# Patient Record
(demographics unavailable — no encounter records)

---

## 2024-11-18 NOTE — HEALTH RISK ASSESSMENT
[Very Good] : ~his/her~  mood as very good [Intercurrent hospitalizations] : was admitted to the hospital  [Yes] : Yes [2 - 3 times a week (3 pts)] : 2 - 3  times a week (3 points) [No] : In the past 12 months have you used drugs other than those required for medical reasons? No [No falls in past year] : Patient reported no falls in the past year [0] : 2) Feeling down, depressed, or hopeless: Not at all (0) [Time Spent: ___ Minutes] : I spent [unfilled] minutes performing a depression screening for this patient. [Designated Healthcare Proxy] : Designated healthcare proxy [Name: ___] : Health Care Proxy's Name: [unfilled]  [Relationship: ___] : Relationship: [unfilled] [Never] : Never [Patient reported mammogram was normal] : Patient reported mammogram was normal [Patient reported bone density results were normal] : Patient reported bone density results were normal [HIV Test offered] : HIV Test offered [Hepatitis C test offered] : Hepatitis C test offered [None] : None [With Family] : lives with family [# of Members in Household ___] :  household currently consist of [unfilled] member(s) [Retired] : retired [College] : College [] :  [# Of Children ___] : has [unfilled] children [Feels Safe at Home] : Feels safe at home [Fully functional (bathing, dressing, toileting, transferring, walking, feeding)] : Fully functional (bathing, dressing, toileting, transferring, walking, feeding) [Fully functional (using the telephone, shopping, preparing meals, housekeeping, doing laundry, using] : Fully functional and needs no help or supervision to perform IADLs (using the telephone, shopping, preparing meals, housekeeping, doing laundry, using transportation, managing medications and managing finances) [Reports normal functional visual acuity (ie: able to read med bottle)] : Reports normal functional visual acuity [Smoke Detector] : smoke detector [Carbon Monoxide Detector] : carbon monoxide detector [Seat Belt] :  uses seat belt [Sunscreen] : uses sunscreen [de-identified] : see above [de-identified] : 3-4 glasses wine [de-identified] : works out with  2x weekly; stationary bike ---2x weekly; golf ---3x weekly [de-identified] : ad rodriguez [[id number]] : 0 [AdvancecareDate] : 04/22 [Change in mental status noted] : No change in mental status noted [Language] : denies difficulty with language [Handling Complex Tasks] : denies difficulty handling complex tasks [High Risk Behavior] : no high risk behavior [Reports changes in hearing] : Reports no changes in hearing [Reports changes in vision] : Reports no changes in vision [Reports changes in dental health] : Reports no changes in dental health [Travel to Developing Areas] : does not  travel to developing areas [TB Exposure] : is not being exposed to tuberculosis [MammogramDate] : 5/2024 [BoneDensityDate] : 7/2023 [ColonoscopyDate] : 03/22 [ColonoscopyComments] : repeat 5 years  [FreeTextEntry2] :  - [facility]  [FreeTextEntry3] : 2 grandchildren  [de-identified] : L cataract

## 2024-11-18 NOTE — PLAN
[FreeTextEntry1] : [ ] shingrix  GI Dr. 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 Lisker   imp--   hypertension---normotensive on meds  arteriosclerosis---nonocclussive disease on cath in 2010; asx. stress tests neg. in past  #HLD lifestyle modification discussed at length discussed transition to high potency statin given hx of CAD, with goal LDL < 70 - will discuss with Dr. 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 Keller's group; f/u carcinoid tumor per [facility] [place] thoracic surg. group annual CT chest  #Pancreatic lesion MRI recently 6/2024 for monitoring - will discuss with GI  [ ] Dr. Sneed - GI     I spent a total of 40 minutes on the date of this encounter that EXCLUDES time spent on AWV, preventive exam, depression screening, tobacco cessation, lung cancer screening and behavioral counseling.  This additional time included: Preparing to see the patient (e.g., review of test results) Obtaining and/or reviewing separately obtained history Performing a medically appropriate exam and/or evaluation Counseling and educating the patient/family/caregiver Ordering medications, tests, procedures Referring and communicating with other healthcare professionals, when not separately reported Documenting clinical information in the health record Care coordination not separately reported

## 2024-11-18 NOTE — HISTORY OF PRESENT ILLNESS
[FreeTextEntry1] : Patient presents today for follow-up of chronic medical conditions.  [de-identified] : PMHx: HTN, HLD   will be traveling to [place] in a couple of weeks feels well - some stress but otherwise feels okay upcoming surveillance screening   recently traveled to [place] for [place] and began feeling unwell dry cough, wheezing, low grade fever  went to  in [place] and prescribed cocktail of tessalon perles, steroid taper, albuterol, z-pack  helps somewhat went to ED and got CXR and prednisone - CXR clear, tested positive for RSV  still with dry cough, wheezing, but overall improving  specialists--- GI Dr. 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 Keller; gets CT annually  Cardiology - Dr. Noble

## 2025-06-09 NOTE — HISTORY OF PRESENT ILLNESS
[FreeTextEntry1] : AWV Patient presents today for follow-up of chronic medical conditions.  [de-identified] : PMHx: HTN, HLD   feels sick along with her  since last week dry cough, fever, fatigue, lack of appetite, diarrhea, taste is off    spent winter in [place] -  cipro/flagyl completed 7d course     will be traveling to [place] in a couple of weeks feels well - some stress but otherwise feels okay upcoming surveillance screening   recently traveled to [place] for [place]iving and began feeling unwell dry cough, wheezing, low grade fever  went to  in [place] and prescribed cocktail of tessalon perles, steroid taper, albuterol, z-pack  helps somewhat went to ED and got CXR and prednisone - CXR clear, tested positive for RSV  still with dry cough, wheezing, but overall improving  specialists--- GI Dr. 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 Keller; gets CT annually  Cardiology - Dr. Noble

## 2025-06-09 NOTE — HEALTH RISK ASSESSMENT
[Very Good] : ~his/her~  mood as very good [Intercurrent hospitalizations] : was admitted to the hospital  [2 - 3 times a week (3 pts)] : 2 - 3  times a week (3 points) [No falls in past year] : Patient reported no falls in the past year [No] : In the past 12 months have you used drugs other than those required for medical reasons? No [0] : 1) Little interest or pleasure doing things: Not at all (0) [Time Spent: ___ Minutes] : I spent [unfilled] minutes performing a depression screening for this patient. [Never] : Never [Patient reported mammogram was normal] : Patient reported mammogram was normal [Patient reported bone density results were normal] : Patient reported bone density results were normal [HIV Test offered] : HIV Test offered [Hepatitis C test offered] : Hepatitis C test offered [With Family] : lives with family [# of Members in Household ___] :  household currently consist of [unfilled] member(s) [Retired] : retired [College] : College [] :  [# Of Children ___] : has [unfilled] children [Feels Safe at Home] : Feels safe at home [Fully functional (bathing, dressing, toileting, transferring, walking, feeding)] : Fully functional (bathing, dressing, toileting, transferring, walking, feeding) [Fully functional (using the telephone, shopping, preparing meals, housekeeping, doing laundry, using] : Fully functional and needs no help or supervision to perform IADLs (using the telephone, shopping, preparing meals, housekeeping, doing laundry, using transportation, managing medications and managing finances) [Reports normal functional visual acuity (ie: able to read med bottle)] : Reports normal functional visual acuity [Carbon Monoxide Detector] : carbon monoxide detector [Smoke Detector] : smoke detector [Seat Belt] :  uses seat belt [Sunscreen] : uses sunscreen [Designated Healthcare Proxy] : Designated healthcare proxy [Name: ___] : Health Care Proxy's Name: [unfilled]  [Relationship: ___] : Relationship: [unfilled] [None] : Patient does not have any barriers to medication adherence [Opioids] : opioids [Yes] : Reviewed medication list for presence of high-risk medications. [NO] : No [Patient reported PAP Smear was normal] : Patient reported PAP Smear was normal [de-identified] : see above [de-identified] : 3-4 glasses wine [de-identified] : works out with  2x weekly; stationary bike ---2x weekly; golf ---3x weekly [de-identified] : ad rodriguez [[id number]] : 0 [Change in mental status noted] : No change in mental status noted [Language] : denies difficulty with language [Handling Complex Tasks] : denies difficulty handling complex tasks [High Risk Behavior] : no high risk behavior [Reports changes in hearing] : Reports no changes in hearing [Reports changes in vision] : Reports no changes in vision [Reports changes in dental health] : Reports no changes in dental health [Travel to Developing Areas] : does not  travel to developing areas [TB Exposure] : is not being exposed to tuberculosis [PapSmearDate] : 11/2024 [MammogramDate] : 5/2025 [BoneDensityDate] : 7/2023 [ColonoscopyDate] : 03/22 [ColonoscopyComments] : repeat 5 years  [FreeTextEntry3] : 2 grandchildren  [FreeTextEntry2] :  - [facility]  [de-identified] : L cataract  [AdvancecareDate] : 04/22

## 2025-06-09 NOTE — PHYSICAL EXAM
[No Acute Distress] : no acute distress [Well Nourished] : well nourished [Well Developed] : well developed [Well-Appearing] : well-appearing [PERRL] : pupils equal round and reactive to light [Normal Sclera/Conjunctiva] : normal sclera/conjunctiva [EOMI] : extraocular movements intact [Normal Outer Ear/Nose] : the outer ears and nose were normal in appearance [Normal Oropharynx] : the oropharynx was normal [No JVD] : no jugular venous distention [No Lymphadenopathy] : no lymphadenopathy [Supple] : supple [Thyroid Normal, No Nodules] : the thyroid was normal and there were no nodules present [No Respiratory Distress] : no respiratory distress  [No Accessory Muscle Use] : no accessory muscle use [Clear to Auscultation] : lungs were clear to auscultation bilaterally [Normal Rate] : normal rate  [Regular Rhythm] : with a regular rhythm [Normal S1, S2] : normal S1 and S2 [No Murmur] : no murmur heard [No Carotid Bruits] : no carotid bruits [No Abdominal Bruit] : a ~M bruit was not heard ~T in the abdomen [No Varicosities] : no varicosities [Pedal Pulses Present] : the pedal pulses are present [No Edema] : there was no peripheral edema [No Palpable Aorta] : no palpable aorta [No Extremity Clubbing/Cyanosis] : no extremity clubbing/cyanosis [Soft] : abdomen soft [Non Tender] : non-tender [Non-distended] : non-distended [No Masses] : no abdominal mass palpated [No HSM] : no HSM [Normal Bowel Sounds] : normal bowel sounds [No CVA Tenderness] : no CVA  tenderness [No Joint Swelling] : no joint swelling [No Spinal Tenderness] : no spinal tenderness [Grossly Normal Strength/Tone] : grossly normal strength/tone [No Rash] : no rash [Coordination Grossly Intact] : coordination grossly intact [No Focal Deficits] : no focal deficits [Normal Gait] : normal gait [Deep Tendon Reflexes (DTR)] : deep tendon reflexes were 2+ and symmetric [Normal Affect] : the affect was normal [Normal Insight/Judgement] : insight and judgment were intact

## 2025-06-09 NOTE — HISTORY OF PRESENT ILLNESS
[FreeTextEntry1] : AWV Patient presents today for follow-up of chronic medical conditions.  [de-identified] : PMHx: HTN, HLD   feels sick along with her  since last week dry cough, fever, fatigue, lack of appetite, diarrhea, taste is off    spent winter in [place] -  cipro/flagyl completed 7d course     will be traveling to [place] in a couple of weeks feels well - some stress but otherwise feels okay upcoming surveillance screening   recently traveled to [place] for [place] and began feeling unwell dry cough, wheezing, low grade fever  went to  in [place] and prescribed cocktail of tessalon perles, steroid taper, albuterol, z-pack  helps somewhat went to ED and got CXR and prednisone - CXR clear, tested positive for RSV  still with dry cough, wheezing, but overall improving  specialists--- GI Dr. 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 Keller; gets CT annually  Cardiology - Dr. Noble

## 2025-06-09 NOTE — PLAN
[FreeTextEntry1] : EKG obtained to assist in diagnosis and management of assessed problem(s).    EKG shows sinus rhythm with poor R wave progression without acute ischemic changes.  shingrix complete   GI Dr. 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 Lisker   imp--   hypertension---normotensive on meds  arteriosclerosis---nonocclussive disease on cath in 2010; asx. stress tests neg. in past  #HLD lifestyle modification discussed at length discussed transition to high potency statin given hx of CAD, with goal LDL < 70 - will discuss with Dr. 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 Keller's group; f/u carcinoid tumor per [facility] thoracic surg. group annual CT chest  #Pancreatic lesion incidental finding MRI recently 6/2024 > 6/2025 for monitoring [ ] Dr. Sneed - GI     I spent a total of 40 minutes on the date of this encounter that EXCLUDES time spent on AWV, preventive exam, depression screening, tobacco cessation, lung cancer screening and behavioral counseling.  This additional time included: Preparing to see the patient (e.g., review of test results) Obtaining and/or reviewing separately obtained history Performing a medically appropriate exam and/or evaluation Counseling and educating the patient/family/caregiver Ordering medications, tests, procedures Referring and communicating with other healthcare professionals, when not separately reported Documenting clinical information in the health record Care coordination not separately reported

## 2025-06-09 NOTE — PLAN
[FreeTextEntry1] : EKG obtained to assist in diagnosis and management of assessed problem(s).    EKG shows sinus rhythm with poor R wave progression without acute ischemic changes.  shingrix complete   GI Dr. 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 Lisker   imp--   hypertension---normotensive on meds  arteriosclerosis---nonocclussive disease on cath in 2010; asx. stress tests neg. in past  #HLD lifestyle modification discussed at length discussed transition to high potency statin given hx of CAD, with goal LDL < 70 - will discuss with Dr. 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 Keller's group; f/u carcinoid tumor per [facility] [place] thoracic surg. group annual CT chest  #Pancreatic lesion incidental finding MRI recently 6/2024 > 6/2025 for monitoring [ ] Dr. Sneed - GI     I spent a total of 40 minutes on the date of this encounter that EXCLUDES time spent on AWV, preventive exam, depression screening, tobacco cessation, lung cancer screening and behavioral counseling.  This additional time included: Preparing to see the patient (e.g., review of test results) Obtaining and/or reviewing separately obtained history Performing a medically appropriate exam and/or evaluation Counseling and educating the patient/family/caregiver Ordering medications, tests, procedures Referring and communicating with other healthcare professionals, when not separately reported Documenting clinical information in the health record Care coordination not separately reported

## 2025-06-09 NOTE — PHYSICAL EXAM
[No Acute Distress] : no acute distress [Well Nourished] : well nourished [Well Developed] : well developed [Well-Appearing] : well-appearing [Normal Sclera/Conjunctiva] : normal sclera/conjunctiva [PERRL] : pupils equal round and reactive to light [EOMI] : extraocular movements intact [Normal Outer Ear/Nose] : the outer ears and nose were normal in appearance [Normal Oropharynx] : the oropharynx was normal [No JVD] : no jugular venous distention [No Lymphadenopathy] : no lymphadenopathy [Supple] : supple [Thyroid Normal, No Nodules] : the thyroid was normal and there were no nodules present [No Respiratory Distress] : no respiratory distress  [No Accessory Muscle Use] : no accessory muscle use [Clear to Auscultation] : lungs were clear to auscultation bilaterally [Normal Rate] : normal rate  [Normal S1, S2] : normal S1 and S2 [Regular Rhythm] : with a regular rhythm [No Murmur] : no murmur heard [No Abdominal Bruit] : a ~M bruit was not heard ~T in the abdomen [No Carotid Bruits] : no carotid bruits [No Varicosities] : no varicosities [Pedal Pulses Present] : the pedal pulses are present [No Edema] : there was no peripheral edema [No Palpable Aorta] : no palpable aorta [No Extremity Clubbing/Cyanosis] : no extremity clubbing/cyanosis [Soft] : abdomen soft [Non Tender] : non-tender [Non-distended] : non-distended [No Masses] : no abdominal mass palpated [No HSM] : no HSM [Normal Bowel Sounds] : normal bowel sounds [No CVA Tenderness] : no CVA  tenderness [No Joint Swelling] : no joint swelling [No Spinal Tenderness] : no spinal tenderness [Grossly Normal Strength/Tone] : grossly normal strength/tone [No Rash] : no rash [Coordination Grossly Intact] : coordination grossly intact [No Focal Deficits] : no focal deficits [Normal Gait] : normal gait [Deep Tendon Reflexes (DTR)] : deep tendon reflexes were 2+ and symmetric [Normal Affect] : the affect was normal [Normal Insight/Judgement] : insight and judgment were intact

## 2025-06-09 NOTE — HEALTH RISK ASSESSMENT
[Very Good] : ~his/her~  mood as very good [Intercurrent hospitalizations] : was admitted to the hospital  [2 - 3 times a week (3 pts)] : 2 - 3  times a week (3 points) [No] : In the past 12 months have you used drugs other than those required for medical reasons? No [No falls in past year] : Patient reported no falls in the past year [0] : 2) Feeling down, depressed, or hopeless: Not at all (0) [Time Spent: ___ Minutes] : I spent [unfilled] minutes performing a depression screening for this patient. [Never] : Never [Patient reported mammogram was normal] : Patient reported mammogram was normal [Patient reported bone density results were normal] : Patient reported bone density results were normal [HIV Test offered] : HIV Test offered [Hepatitis C test offered] : Hepatitis C test offered [# of Members in Household ___] :  household currently consist of [unfilled] member(s) [With Family] : lives with family [Retired] : retired [College] : College [] :  [# Of Children ___] : has [unfilled] children [Feels Safe at Home] : Feels safe at home [Fully functional (bathing, dressing, toileting, transferring, walking, feeding)] : Fully functional (bathing, dressing, toileting, transferring, walking, feeding) [Fully functional (using the telephone, shopping, preparing meals, housekeeping, doing laundry, using] : Fully functional and needs no help or supervision to perform IADLs (using the telephone, shopping, preparing meals, housekeeping, doing laundry, using transportation, managing medications and managing finances) [Reports normal functional visual acuity (ie: able to read med bottle)] : Reports normal functional visual acuity [Smoke Detector] : smoke detector [Carbon Monoxide Detector] : carbon monoxide detector [Seat Belt] :  uses seat belt [Sunscreen] : uses sunscreen [Designated Healthcare Proxy] : Designated healthcare proxy [Name: ___] : Health Care Proxy's Name: [unfilled]  [Relationship: ___] : Relationship: [unfilled] [None] : Patient does not have any barriers to medication adherence [Yes] : Reviewed medication list for presence of high-risk medications. [Opioids] : opioids [NO] : No [Patient reported PAP Smear was normal] : Patient reported PAP Smear was normal [de-identified] : see above [de-identified] : 3-4 glasses wine [de-identified] : works out with  2x weekly; stationary bike ---2x weekly; golf ---3x weekly [de-identified] : ad rodriguez [[id number]] : 0 [Change in mental status noted] : No change in mental status noted [Handling Complex Tasks] : denies difficulty handling complex tasks [Language] : denies difficulty with language [High Risk Behavior] : no high risk behavior [Reports changes in hearing] : Reports no changes in hearing [Reports changes in vision] : Reports no changes in vision [Reports changes in dental health] : Reports no changes in dental health [Travel to Developing Areas] : does not  travel to developing areas [TB Exposure] : is not being exposed to tuberculosis [PapSmearDate] : 11/2024 [MammogramDate] : 5/2025 [BoneDensityDate] : 7/2023 [ColonoscopyDate] : 03/22 [ColonoscopyComments] : repeat 5 years  [FreeTextEntry2] :  - [facility]  [FreeTextEntry3] : 2 grandchildren  [de-identified] : L cataract  [AdvancecareDate] : 04/22

## 2025-06-17 NOTE — HISTORY OF PRESENT ILLNESS
[FreeTextEntry1] : Patient presents today for follow-up of chronic medical conditions.  [de-identified] : PMHx: HTN, HLD   feels sick along with her  since last week dry cough, fever, fatigue, lack of appetite, diarrhea, taste is off   influenza A+ took prednisone and flovent along with hycodan qhs  feels slightly improved as of today but continues to cough denies SOB, chest pain, fever  spent winter in [place] -  cipro/flagyl completed 7d course     will be traveling to [place] in a couple of weeks feels well - some stress but otherwise feels okay upcoming surveillance screening   recently traveled to [place] for [place] and began feeling unwell dry cough, wheezing, low grade fever  went to  in [place] and prescribed cocktail of tessalon perles, steroid taper, albuterol, z-pack  helps somewhat went to ED and got CXR and prednisone - CXR clear, tested positive for RSV  still with dry cough, wheezing, but overall improving  specialists--- GI Dr. 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 Keller; gets CT annually  Cardiology - Dr. Noble

## 2025-06-17 NOTE — PLAN
[FreeTextEntry1] : EKG obtained to assist in diagnosis and management of assessed problem(s).    EKG shows sinus rhythm with poor R wave progression without acute ischemic changes.  shingrix complete   GI Dr. 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 Lisker   imp--   hypertension---normotensive on meds  arteriosclerosis---nonocclussive disease on cath in 2010; asx. stress tests neg. in past  #HLD lifestyle modification discussed at length discussed transition to high potency statin given hx of CAD, with goal LDL < 70 - will discuss with Dr. 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 Keller's group; f/u carcinoid tumor per [facility] thoracic surg. group annual CT chest  #Pancreatic lesion incidental finding MRI recently 6/2024 > 6/2025 for monitoring [ ] Dr. Sneed - GI     I spent a total of 40 minutes on the date of this encounter that EXCLUDES time spent on AWV, preventive exam, depression screening, tobacco cessation, lung cancer screening and behavioral counseling.  This additional time included: Preparing to see the patient (e.g., review of test results) Obtaining and/or reviewing separately obtained history Performing a medically appropriate exam and/or evaluation Counseling and educating the patient/family/caregiver Ordering medications, tests, procedures Referring and communicating with other healthcare professionals, when not separately reported Documenting clinical information in the health record Care coordination not separately reported

## 2025-06-17 NOTE — HEALTH RISK ASSESSMENT
[de-identified] : see above [de-identified] : 3-4 glasses wine [de-identified] : works out with  2x weekly; stationary bike ---2x weekly; golf ---3x weekly [de-identified] : ad rodriguez [[id number]] : 0 [AdvancecareDate] : 04/22 [Change in mental status noted] : No change in mental status noted [Language] : denies difficulty with language [Handling Complex Tasks] : denies difficulty handling complex tasks [High Risk Behavior] : no high risk behavior [Reports changes in hearing] : Reports no changes in hearing [Reports changes in vision] : Reports no changes in vision [Reports changes in dental health] : Reports no changes in dental health [Travel to Developing Areas] : does not  travel to developing areas [TB Exposure] : is not being exposed to tuberculosis [MammogramDate] : 5/2025 [PapSmearDate] : 11/2024 [ColonoscopyDate] : 03/22 [BoneDensityDate] : 7/2023 [ColonoscopyComments] : repeat 5 years  [FreeTextEntry2] :  - [facility]  [FreeTextEntry3] : 2 grandchildren  [de-identified] : L cataract

## 2025-06-17 NOTE — HISTORY OF PRESENT ILLNESS
[FreeTextEntry1] : Patient presents today for follow-up of chronic medical conditions.  [de-identified] : PMHx: HTN, HLD   feels sick along with her  since last week dry cough, fever, fatigue, lack of appetite, diarrhea, taste is off   influenza A+ took prednisone and flovent along with hycodan qhs  feels slightly improved as of today but continues to cough denies SOB, chest pain, fever  spent winter in [place] -  cipro/flagyl completed 7d course     will be traveling to [place] in a couple of weeks feels well - some stress but otherwise feels okay upcoming surveillance screening   recently traveled to [place] for [place]iving and began feeling unwell dry cough, wheezing, low grade fever  went to  in [place] and prescribed cocktail of tessalon perles, steroid taper, albuterol, z-pack  helps somewhat went to ED and got CXR and prednisone - CXR clear, tested positive for RSV  still with dry cough, wheezing, but overall improving  specialists--- GI Dr. 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 Keller; gets CT annually  Cardiology - Dr. Noble

## 2025-06-17 NOTE — HEALTH RISK ASSESSMENT
[de-identified] : see above [de-identified] : 3-4 glasses wine [de-identified] : works out with  2x weekly; stationary bike ---2x weekly; golf ---3x weekly [de-identified] : ad rodriguez [[id number]] : 0 [AdvancecareDate] : 04/22 [Change in mental status noted] : No change in mental status noted [Language] : denies difficulty with language [Handling Complex Tasks] : denies difficulty handling complex tasks [High Risk Behavior] : no high risk behavior [Reports changes in hearing] : Reports no changes in hearing [Reports changes in vision] : Reports no changes in vision [Reports changes in dental health] : Reports no changes in dental health [Travel to Developing Areas] : does not  travel to developing areas [TB Exposure] : is not being exposed to tuberculosis [MammogramDate] : 5/2025 [PapSmearDate] : 11/2024 [BoneDensityDate] : 7/2023 [ColonoscopyDate] : 03/22 [ColonoscopyComments] : repeat 5 years  [FreeTextEntry2] :  - [facility]  [FreeTextEntry3] : 2 grandchildren  [de-identified] : L cataract

## 2025-06-17 NOTE — PLAN
[FreeTextEntry1] : EKG obtained to assist in diagnosis and management of assessed problem(s).    EKG shows sinus rhythm with poor R wave progression without acute ischemic changes.  shingrix complete   GI Dr. Sneed - follows pancreatic cyst and diverticulitis with annual MRI  [place]N - In [place]  Pulmonary - Carcinoid lung tumor s/p [place] 1/2021 [place] [facility] [place] (11/2024 stable) Cardiology - Dr. Lisker   imp--   hypertension---normotensive on meds  arteriosclerosis---nonocclussive disease on cath in 2010; asx. stress tests neg. in past  #HLD lifestyle modification discussed at length discussed transition to high potency statin given hx of CAD, with goal LDL < 70 - will discuss with Dr. 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 Keller's group; f/u carcinoid tumor per [facility] thoracic surg. group annual CT chest  #Pancreatic lesion incidental finding MRI recently 6/2024 > 6/2025 for monitoring [ ] Dr. Sneed - GI     I spent a total of 40 minutes on the date of this encounter that EXCLUDES time spent on AWV, preventive exam, depression screening, tobacco cessation, lung cancer screening and behavioral counseling.  This additional time included: Preparing to see the patient (e.g., review of test results) Obtaining and/or reviewing separately obtained history Performing a medically appropriate exam and/or evaluation Counseling and educating the patient/family/caregiver Ordering medications, tests, procedures Referring and communicating with other healthcare professionals, when not separately reported Documenting clinical information in the health record Care coordination not separately reported